# Patient Record
Sex: MALE | Race: WHITE
[De-identification: names, ages, dates, MRNs, and addresses within clinical notes are randomized per-mention and may not be internally consistent; named-entity substitution may affect disease eponyms.]

---

## 2018-11-08 ENCOUNTER — HOSPITAL ENCOUNTER (OUTPATIENT)
Dept: HOSPITAL 92 - BICULT | Age: 58
Discharge: HOME | End: 2018-11-08
Attending: PHYSICIAN ASSISTANT
Payer: OTHER GOVERNMENT

## 2018-11-08 DIAGNOSIS — K80.20: ICD-10-CM

## 2018-11-08 DIAGNOSIS — R14.0: ICD-10-CM

## 2018-11-08 DIAGNOSIS — K83.8: ICD-10-CM

## 2018-11-08 DIAGNOSIS — K76.0: ICD-10-CM

## 2018-11-08 DIAGNOSIS — R10.9: Primary | ICD-10-CM

## 2018-11-08 PROCEDURE — 76700 US EXAM ABDOM COMPLETE: CPT

## 2018-11-08 NOTE — ULT
ABDOMINAL UTLRASOUND:

 

DATE: 11/8/2018.

 

HISTORY: 

Abdominal pain.

 

FINDINGS: 

The majority of the pancreas is obscured by bowel gas.  The proximal abdominal aorta is also obscured
 by bowel gas, but the mid and distal abdominal aorta are normal in caliber.  The visualized portions
 of the IVC, spleen, and the bilateral kidneys demonstrate a normal sonographic appearance.  The righ
t kidney measures 9.9 cm in length with the left kidney measuring 10.3 cm in length.

 

The liver demonstrates increased echogenicity suggesting diffuse fatty infiltration with a small hypo
echoic area seen adjacent to the gallbladder probably related to focal area of fatty sparing.

 

There are mobile echogenic foci with posterior shadowing seen in the gallbladder lumen consistent wit
h gallbladder calculi.  There is no gallbladder wall thickening or pericholecystic fluid.  The common
 duct is not well visualized but where seen does appear mildly dilated measuring 0.7 cm in diameter. 
 No intrahepatic biliary ductal dilatation is appreciated.

 

IMPRESSION: 

1.  Fatty infiltration with probable focal area of fatty sparing adjacent to th gallbladder.

 

2.  Cholelithiasis.

 

3.  Mild dilatation of the common duct measuring 0.7 cm in diameter.  No intrahepatic biliary ductal 
dilatation is visualized.  The exact etiology for dilatation of the common duct is uncertain based on
 this exam.

 

POS: [facility]

## 2018-12-13 NOTE — HP
HISTORY OF PRESENT ILLNESS:  Josue Rg is a 58-year-old male patient

followed by Dr. Whitley and Dr. Cho. The patient recently had an EGD. Colonoscopy

was normal. He has been having epigastric pain, back radiation. He has a history of

achalasia. Ultrasound of the gallbladder revealed liver steatosis, fatty liver,

cholelithiasis, 7 mm bile duct. Plan is for laparoscopic video cholecystectomy,

cholangiograms, and core liver biopsy. Risks of infection, bleeding, visceral and

biliary injury, open procedure were discussed and he consents. 



ALLERGIES:  NONE.



TOBACCO:  None.



ALCOHOL:  Two beers a day, three shots a day.



PAST SURGICAL HISTORY:  In 2011, sigmoid resection for diverticulitis.



PAST MEDICAL HISTORY:  Diverticulosis.



MEDICATIONS:  Prilosec  40 mg twice a day.



REVIEW OF SYSTEMS:  Ten-point noncontributory. Cardiac review of systems negative.



PHYSICAL EXAMINATION:

VITAL SIGNS: 190 pounds, 5 feet 9 inches. 155/88, 93, 99.3 degrees. 

HEAD, EARS, EYES, NOSE, AND THROAT: Unremarkable. Sclerae nonicteric. 

SKIN: No jaundice. 

LUNGS: Clear to auscultation. 

CARDIAC: Regular rate and rhythm without murmur or gallop. 

ABDOMEN: Soft and nontender. No masses. 

EXTREMITIES: Unremarkable.



ASSESSMENT AND PLAN:  Fatty liver, obesity, cholelithiasis, cholecystitis. He has

had several attacks over the last few months. Plan is for laparoscopic video 

cholecystectomy, cholangiograms, and core liver biopsy. Risks and benefits

discussed. Questions answered. 







Job ID:  [id number]

## 2018-12-17 ENCOUNTER — HOSPITAL ENCOUNTER (OUTPATIENT)
Dept: HOSPITAL 92 - SDC | Age: 58
Discharge: HOME | End: 2018-12-17
Attending: SURGERY
Payer: OTHER GOVERNMENT

## 2018-12-17 VITALS — BODY MASS INDEX: 28 KG/M2

## 2018-12-17 DIAGNOSIS — K80.10: Primary | ICD-10-CM

## 2018-12-17 DIAGNOSIS — K76.0: ICD-10-CM

## 2018-12-17 DIAGNOSIS — K66.0: ICD-10-CM

## 2018-12-17 LAB
ALBUMIN SERPL BCG-MCNC: 4.2 G/DL (ref 3.5–5)
ALP SERPL-CCNC: 84 U/L (ref 40–150)
ALT SERPL W P-5'-P-CCNC: 54 U/L (ref 8–55)
ANION GAP SERPL CALC-SCNC: 14 MMOL/L (ref 10–20)
AST SERPL-CCNC: 50 U/L (ref 5–34)
BASOPHILS # BLD AUTO: 0.1 THOU/UL (ref 0–0.2)
BASOPHILS NFR BLD AUTO: 0.9 % (ref 0–1)
BILIRUB SERPL-MCNC: 1.6 MG/DL (ref 0.2–1.2)
BUN SERPL-MCNC: 15 MG/DL (ref 8.4–25.7)
CALCIUM SERPL-MCNC: 8.9 MG/DL (ref 7.8–10.44)
CHLORIDE SERPL-SCNC: 103 MMOL/L (ref 98–107)
CO2 SERPL-SCNC: 24 MMOL/L (ref 22–29)
CREAT CL PREDICTED SERPL C-G-VRATE: 100 ML/MIN (ref 70–130)
EOSINOPHIL # BLD AUTO: 0.5 THOU/UL (ref 0–0.7)
EOSINOPHIL NFR BLD AUTO: 4.9 % (ref 0–10)
GLOBULIN SER CALC-MCNC: 3 G/DL (ref 2.4–3.5)
GLUCOSE SERPL-MCNC: 102 MG/DL (ref 70–105)
HGB BLD-MCNC: 16.3 G/DL (ref 14–18)
LYMPHOCYTES # BLD: 1.5 THOU/UL (ref 1.2–3.4)
LYMPHOCYTES NFR BLD AUTO: 16.4 % (ref 21–51)
MCH RBC QN AUTO: 34.5 PG (ref 27–31)
MCV RBC AUTO: 100 FL (ref 78–98)
MONOCYTES # BLD AUTO: 1 THOU/UL (ref 0.11–0.59)
MONOCYTES NFR BLD AUTO: 10.4 % (ref 0–10)
NEUTROPHILS # BLD AUTO: 6.3 THOU/UL (ref 1.4–6.5)
NEUTROPHILS NFR BLD AUTO: 67.5 % (ref 42–75)
PLATELET # BLD AUTO: 235 THOU/UL (ref 130–400)
POTASSIUM SERPL-SCNC: 4 MMOL/L (ref 3.5–5.1)
RBC # BLD AUTO: 4.74 MILL/UL (ref 4.7–6.1)
SODIUM SERPL-SCNC: 137 MMOL/L (ref 136–145)
WBC # BLD AUTO: 9.4 THOU/UL (ref 4.8–10.8)

## 2018-12-17 PROCEDURE — 96374 THER/PROPH/DIAG INJ IV PUSH: CPT

## 2018-12-17 PROCEDURE — 85025 COMPLETE CBC W/AUTO DIFF WBC: CPT

## 2018-12-17 PROCEDURE — 93010 ELECTROCARDIOGRAM REPORT: CPT

## 2018-12-17 PROCEDURE — 47532 INJECTION FOR CHOLANGIOGRAM: CPT

## 2018-12-17 PROCEDURE — 93005 ELECTROCARDIOGRAM TRACING: CPT

## 2018-12-17 PROCEDURE — 88307 TISSUE EXAM BY PATHOLOGIST: CPT

## 2018-12-17 PROCEDURE — 80053 COMPREHEN METABOLIC PANEL: CPT

## 2018-12-17 PROCEDURE — 88313 SPECIAL STAINS GROUP 2: CPT

## 2018-12-17 PROCEDURE — 71045 X-RAY EXAM CHEST 1 VIEW: CPT

## 2018-12-17 PROCEDURE — 36415 COLL VENOUS BLD VENIPUNCTURE: CPT

## 2018-12-17 PROCEDURE — 88304 TISSUE EXAM BY PATHOLOGIST: CPT

## 2018-12-17 NOTE — OP
DATE OF PROCEDURE:  12/17/2018



PREOPERATIVE DIAGNOSES:  Cholecystitis, cholelithiasis, fatty liver, adhesions from

prior surgery (sigmoid colon resection, infraumbilical incision). 



POSTOPERATIVE DIAGNOSES:  Cholecystitis, cholelithiasis, fatty liver, adhesions from

prior surgery (sigmoid colon resection, infraumbilical incision) noting adhesions in

small bowel to the periumbilical areas. 



ANESTHESIA:  General with local of 0.5% Marcaine with epinephrine, 30 mL.



PROCEDURES PERFORMED:  Laparoscopic video cholecystectomy, laparoscopic

cholangiograms, laparoscopic core liver biopsies, right lobe. 



DESCRIPTION OF PROCEDURE:  The patient was taken to the operating room under general

anesthesia, abdomen was clipped, prepared with ChloraPrep and draped in routine

fashion. Local anesthetic was infiltrated in the skin and subcutaneous tissue about

the operative site. Because of a prior infraumbilical incision from a colon

resection, a right mid clavicular subcostal incision was made, pneumoperitoneum to

15 mmHg was obtained with a Veress needle, replaced with a 5 port, laparoscope was

inserted. There were noted to be adhesions about periumbilical with small bowel

plastered against the abdominal wall. A right subxiphoid incision was made, and 11

port was placed. A right lateral subcostal incision was made. Another 5 port placed.

Right lower quadrant incision was made in adhesion free area and a 5 mm port placed

with the laparoscope moved to this port and laparoscopic cholecystectomy was

undertaken, noting a fatty liver without nodularity. Fundus of the gallbladder was

grasped at cephalad. The infundibulum was grasped and reflected laterally. Cystic

artery and duct dissected free. Critical view obtained. Cystic duct singly clipped

on the gallbladder side, remaining cystic duct cholangiogram was obtained using

fluoroscopy, injecting contrast in the biliary tree noting small caliber bile ducts

without filling defects visualized in left and right hepatic ducts, common hepatic,

common bile ducts, and contrast free flow into the duodenum without filling defects.

Cholangiocatheter was removed. Cystic artery and duct double clipped proximally and

divided. Gallbladder dissected free from liver bed obtaining good hemostasis prior

to division of the final peritoneal attachments. Good hemostasis was obtained with

cautery.  Jose was used. Core biopsies obtained using 

the core biopsy gun inserted intercostal. Two core biopsies obtained, submitted to

Pathology. Good hemostasis was obtained with cautery. Irrigant and pneumoperitoneum

evacuated. All instruments were removed, and all skin incisions were approximated

with subdermal 4-0 Monocryl and Derma glue applied. 







Job ID:  [id number]

## 2018-12-17 NOTE — EKG
Test Reason : PREOP

Blood Pressure : ***/*** mmHG

Vent. Rate : 082 BPM     Atrial Rate : 082 BPM

   P-R Int : 144 ms          QRS Dur : 084 ms

    QT Int : 372 ms       P-R-T Axes : 002 093 050 degrees

   QTc Int : 434 ms

 

Normal sinus rhythm

Rightward axis /Borderline

Borderline ECG

When compared with ECG of 19-APR-2011 21:04,

No significant change was found

Confirmed by FLORENCE BURGESS ([id number]) on 12/17/2018 12:07:02 PM

 

Referred By:  DAYANARA           Confirmed By:FLORENCE BURGESS

## 2018-12-17 NOTE — RAD
UPRIGHT PORTABLE CHEST ONE VIEW:

 

HISTORY:

A 58-year-old male with a history of postop pain.

 

FINDINGS:

There is a large retrocardiac and central mediastinal double density.  This could represent a large h
iatal hernia.  The patient has had a very markedly dilated distal esophagus, in association with dillon
lasia in the past, and this could just represent a very markedly dilated, tortuous esophagus.  At the
 level of the tracheal bifurcation, there is evidence for a dilated esophagus at this level, with oneil
e material within the esophagus below this, forming an air/soft tissue interface.  There appears to b
e much more severe dilatation of the esophagus, when compared to a prior  view from a barium swa
llow, dated 12/24/2014.  There are some minimal parenchymal changes in the lung bases, having more th
e appearance of some subsegmental atelectasis.

 

IMPRESSION:

1.  Very large retrocardiac density, evidence for a hiatal hernia/very severely dilated, tortuous dis
amilcar esophagus, probably secondary to associated achalasia, with a dilated upper thoracic esophagus, w
ith an air soft tissue density, probably representing some retained food or secretions within the dil
ated esophagus.  This has become much more dilated when compared to a prior 12/24/2014 plain film of 
the chest.

 

2.  Minimal parenchymal changes in both lung bases, nonspecific.  These changes could represent minim
al pneumonia or pneumonitis and/or subsegmental atelectasis.  Consider short-term followup, to includ
e upright PA and lateral chest, whenever the patient can undergo that study.

 

POS: [facility]

## 2018-12-17 NOTE — RAD
OPERATIVE CHOLANGIOGRAM:

 

A single fluoroscopic image taken during the OR during operative angiogram procedure is presented. 

 

Indications: Intraoperative imaging to assess the bowel ducts. 

 

FINDINGS/IMPRESSION: 

Single image shows opacification of the common bile duct and hepatic radicles. Cystic duct is also op
acified. There is no filling defect identified. 

 

POS: [facility]

## 2018-12-19 ENCOUNTER — HOSPITAL ENCOUNTER (OUTPATIENT)
Dept: HOSPITAL 92 - BICRAD | Age: 58
Discharge: HOME | End: 2018-12-19
Attending: SURGERY
Payer: OTHER GOVERNMENT

## 2018-12-19 DIAGNOSIS — J98.4: ICD-10-CM

## 2018-12-19 DIAGNOSIS — Z90.49: ICD-10-CM

## 2018-12-19 DIAGNOSIS — J90: ICD-10-CM

## 2018-12-19 DIAGNOSIS — R06.00: Primary | ICD-10-CM

## 2018-12-19 PROCEDURE — 71046 X-RAY EXAM CHEST 2 VIEWS: CPT

## 2018-12-19 PROCEDURE — 36415 COLL VENOUS BLD VENIPUNCTURE: CPT

## 2018-12-19 PROCEDURE — 85025 COMPLETE CBC W/AUTO DIFF WBC: CPT

## 2018-12-19 PROCEDURE — 80053 COMPREHEN METABOLIC PANEL: CPT

## 2018-12-19 PROCEDURE — 83690 ASSAY OF LIPASE: CPT

## 2018-12-19 NOTE — RAD
EXAM:

CHEST PA AND LATERAL:

12/19/18

 

HISTORY: 

58-year-old male with history of postop pain. 

 

COMPARISON:  

12/17/18.

 

Again noted is a very large retrocardiac mass primarily centrally and right sided as well as so
me abnormal gas and mixed soft tissue density changes in the region overlying the right mediastinum. 
This extensive abnormal finding may be related to very severe esophageal dilatation with known histor
y of achalasia in the past, although this is definitely worsened when compared to old studies back to
 2014.  Compared to the 12/17/18 study, there are small bilateral pleural effusions as well as some b
ibasilar pulmonary parenchymal changes which have developed, evidence for developing bibasilar subseg
mental atelectasis and/or pneumonitis. The mid and upper lung zones are clear. 

 

IMPRESSION:  

Developing small bilateral pleural effusions and bibasilar parenchymal changes showing worsening when
 compared to the 12/17/18 portable chest, evidence for some developing bilateral lower lobe pneumonit
is and/or subsegmental atelectasis. Very prominent primarily right sided retrocardiac and right sided
 upper mediastinal abnormal gas and soft tissue densities which may just be related to very markedly 
abnormally dilated esophagus. Patient has history of achalasia and previously documented dilated esop
hagus, although this is much more marked when compared to a prior 2014 study. A followup CT scan of t
he chest in this regard might be considered if that remains a clinical concern. 

 

POS: [facility]